# Patient Record
Sex: FEMALE | Race: WHITE | Employment: FULL TIME | URBAN - METROPOLITAN AREA
[De-identification: names, ages, dates, MRNs, and addresses within clinical notes are randomized per-mention and may not be internally consistent; named-entity substitution may affect disease eponyms.]

---

## 2019-08-30 ENCOUNTER — HOSPITAL ENCOUNTER (OUTPATIENT)
Dept: PREADMISSION TESTING | Age: 54
Discharge: HOME | End: 2019-08-30
Payer: COMMERCIAL

## 2019-08-30 DIAGNOSIS — Z12.11 SPECIAL SCREENING FOR MALIGNANT NEOPLASMS, COLON: ICD-10-CM

## 2019-08-30 LAB
ALBUMIN SERPL-MCNC: 3.5 G/DL (ref 3.4–5)
ALBUMIN/GLOB SERPL: 1.1 {RATIO} (ref 0.8–1.7)
ALP SERPL-CCNC: 101 U/L (ref 45–117)
ALT SERPL-CCNC: 31 U/L (ref 13–56)
ANION GAP SERPL CALC-SCNC: 4 MMOL/L (ref 3–18)
AST SERPL-CCNC: 12 U/L (ref 10–38)
BASOPHILS # BLD: 0 K/UL (ref 0–0.1)
BASOPHILS NFR BLD: 0 % (ref 0–2)
BILIRUB SERPL-MCNC: 0.3 MG/DL (ref 0.2–1)
BUN SERPL-MCNC: 15 MG/DL (ref 7–18)
BUN/CREAT SERPL: 21 (ref 12–20)
CALCIUM SERPL-MCNC: 9.2 MG/DL (ref 8.5–10.1)
CHLORIDE SERPL-SCNC: 105 MMOL/L (ref 100–111)
CO2 SERPL-SCNC: 31 MMOL/L (ref 21–32)
CREAT SERPL-MCNC: 0.73 MG/DL (ref 0.6–1.3)
DIFFERENTIAL METHOD BLD: ABNORMAL
EOSINOPHIL # BLD: 0.2 K/UL (ref 0–0.4)
EOSINOPHIL NFR BLD: 3 % (ref 0–5)
ERYTHROCYTE [DISTWIDTH] IN BLOOD BY AUTOMATED COUNT: 14.2 % (ref 11.6–14.5)
GLOBULIN SER CALC-MCNC: 3.1 G/DL (ref 2–4)
GLUCOSE SERPL-MCNC: 70 MG/DL (ref 74–99)
HCT VFR BLD AUTO: 37.6 % (ref 35–45)
HGB BLD-MCNC: 11.9 G/DL (ref 12–16)
LYMPHOCYTES # BLD: 1.7 K/UL (ref 0.9–3.6)
LYMPHOCYTES NFR BLD: 25 % (ref 21–52)
MCH RBC QN AUTO: 27.5 PG (ref 24–34)
MCHC RBC AUTO-ENTMCNC: 31.6 G/DL (ref 31–37)
MCV RBC AUTO: 86.8 FL (ref 74–97)
MONOCYTES # BLD: 0.4 K/UL (ref 0.05–1.2)
MONOCYTES NFR BLD: 6 % (ref 3–10)
NEUTS SEG # BLD: 4.5 K/UL (ref 1.8–8)
NEUTS SEG NFR BLD: 66 % (ref 40–73)
PLATELET # BLD AUTO: 334 K/UL (ref 135–420)
PMV BLD AUTO: 10 FL (ref 9.2–11.8)
POTASSIUM SERPL-SCNC: 4.4 MMOL/L (ref 3.5–5.5)
PROT SERPL-MCNC: 6.6 G/DL (ref 6.4–8.2)
RBC # BLD AUTO: 4.33 M/UL (ref 4.2–5.3)
SODIUM SERPL-SCNC: 140 MMOL/L (ref 136–145)
WBC # BLD AUTO: 6.8 K/UL (ref 4.6–13.2)

## 2019-08-30 PROCEDURE — 36415 COLL VENOUS BLD VENIPUNCTURE: CPT

## 2019-08-30 PROCEDURE — 93005 ELECTROCARDIOGRAM TRACING: CPT

## 2019-08-30 PROCEDURE — 80053 COMPREHEN METABOLIC PANEL: CPT

## 2019-08-30 PROCEDURE — 85025 COMPLETE CBC W/AUTO DIFF WBC: CPT

## 2019-08-31 LAB
ATRIAL RATE: 61 BPM
CALCULATED P AXIS, ECG09: 60 DEGREES
CALCULATED R AXIS, ECG10: -19 DEGREES
CALCULATED T AXIS, ECG11: 47 DEGREES
DIAGNOSIS, 93000: NORMAL
P-R INTERVAL, ECG05: 144 MS
Q-T INTERVAL, ECG07: 382 MS
QRS DURATION, ECG06: 88 MS
QTC CALCULATION (BEZET), ECG08: 384 MS
VENTRICULAR RATE, ECG03: 61 BPM

## 2019-09-12 ENCOUNTER — ANESTHESIA EVENT (OUTPATIENT)
Dept: ENDOSCOPY | Age: 54
End: 2019-09-12
Payer: COMMERCIAL

## 2019-09-12 RX ORDER — OXYCODONE AND ACETAMINOPHEN 5; 325 MG/1; MG/1
1 TABLET ORAL AS NEEDED
Status: CANCELLED | OUTPATIENT
Start: 2019-09-12

## 2019-09-12 RX ORDER — MAGNESIUM SULFATE 100 %
4 CRYSTALS MISCELLANEOUS AS NEEDED
Status: CANCELLED | OUTPATIENT
Start: 2019-09-12

## 2019-09-12 RX ORDER — NALOXONE HYDROCHLORIDE 0.4 MG/ML
0.1 INJECTION, SOLUTION INTRAMUSCULAR; INTRAVENOUS; SUBCUTANEOUS
Status: CANCELLED | OUTPATIENT
Start: 2019-09-12

## 2019-09-12 RX ORDER — SODIUM CHLORIDE, SODIUM LACTATE, POTASSIUM CHLORIDE, CALCIUM CHLORIDE 600; 310; 30; 20 MG/100ML; MG/100ML; MG/100ML; MG/100ML
50 INJECTION, SOLUTION INTRAVENOUS CONTINUOUS
Status: CANCELLED | OUTPATIENT
Start: 2019-09-12

## 2019-09-12 RX ORDER — FENTANYL CITRATE 50 UG/ML
25 INJECTION, SOLUTION INTRAMUSCULAR; INTRAVENOUS
Status: CANCELLED | OUTPATIENT
Start: 2019-09-12

## 2019-09-12 RX ORDER — INSULIN LISPRO 100 [IU]/ML
INJECTION, SOLUTION INTRAVENOUS; SUBCUTANEOUS ONCE
Status: CANCELLED | OUTPATIENT
Start: 2019-09-12 | End: 2019-09-13

## 2019-09-12 RX ORDER — ONDANSETRON 2 MG/ML
4 INJECTION INTRAMUSCULAR; INTRAVENOUS ONCE
Status: CANCELLED | OUTPATIENT
Start: 2019-09-12 | End: 2019-09-12

## 2019-09-12 RX ORDER — HYDROMORPHONE HYDROCHLORIDE 2 MG/ML
0.5 INJECTION, SOLUTION INTRAMUSCULAR; INTRAVENOUS; SUBCUTANEOUS
Status: CANCELLED | OUTPATIENT
Start: 2019-09-12

## 2019-09-13 ENCOUNTER — ANESTHESIA (OUTPATIENT)
Dept: ENDOSCOPY | Age: 54
End: 2019-09-13
Payer: COMMERCIAL

## 2019-09-13 ENCOUNTER — HOSPITAL ENCOUNTER (OUTPATIENT)
Age: 54
Setting detail: OUTPATIENT SURGERY
Discharge: HOME | End: 2019-09-13
Attending: SURGERY | Admitting: SURGERY
Payer: COMMERCIAL

## 2019-09-13 VITALS
BODY MASS INDEX: 38.29 KG/M2 | DIASTOLIC BLOOD PRESSURE: 82 MMHG | RESPIRATION RATE: 14 BRPM | SYSTOLIC BLOOD PRESSURE: 149 MMHG | HEART RATE: 74 BPM | WEIGHT: 208.1 LBS | OXYGEN SATURATION: 94 % | TEMPERATURE: 97.3 F | HEIGHT: 62 IN

## 2019-09-13 LAB — HCG UR QL: NEGATIVE

## 2019-09-13 PROCEDURE — 77030020256 HC SOL INJ NACL 0.9%  500ML: Performed by: SURGERY

## 2019-09-13 PROCEDURE — 74011000250 HC RX REV CODE- 250

## 2019-09-13 PROCEDURE — 74011250636 HC RX REV CODE- 250/636

## 2019-09-13 PROCEDURE — 76040000019: Performed by: SURGERY

## 2019-09-13 PROCEDURE — 81025 URINE PREGNANCY TEST: CPT

## 2019-09-13 PROCEDURE — 76060000031 HC ANESTHESIA FIRST 0.5 HR: Performed by: SURGERY

## 2019-09-13 PROCEDURE — 77030040361 HC SLV COMPR DVT MDII -B: Performed by: SURGERY

## 2019-09-13 PROCEDURE — 77030021593 HC FCPS BIOP ENDOSC BSC -A: Performed by: SURGERY

## 2019-09-13 PROCEDURE — 88305 TISSUE EXAM BY PATHOLOGIST: CPT

## 2019-09-13 RX ORDER — GLYCOPYRROLATE 0.2 MG/ML
INJECTION INTRAMUSCULAR; INTRAVENOUS AS NEEDED
Status: DISCONTINUED | OUTPATIENT
Start: 2019-09-13 | End: 2019-09-13 | Stop reason: HOSPADM

## 2019-09-13 RX ORDER — PROPOFOL 10 MG/ML
INJECTION, EMULSION INTRAVENOUS AS NEEDED
Status: DISCONTINUED | OUTPATIENT
Start: 2019-09-13 | End: 2019-09-13 | Stop reason: HOSPADM

## 2019-09-13 RX ORDER — SODIUM CHLORIDE 9 MG/ML
150 INJECTION, SOLUTION INTRAVENOUS CONTINUOUS
Status: DISCONTINUED | OUTPATIENT
Start: 2019-09-13 | End: 2019-09-13 | Stop reason: HOSPADM

## 2019-09-13 RX ORDER — SODIUM CHLORIDE 9 MG/ML
125 INJECTION, SOLUTION INTRAVENOUS CONTINUOUS
Status: CANCELLED | OUTPATIENT
Start: 2019-09-13

## 2019-09-13 RX ADMIN — PROPOFOL 50 MG: 10 INJECTION, EMULSION INTRAVENOUS at 10:04

## 2019-09-13 RX ADMIN — PROPOFOL 50 MG: 10 INJECTION, EMULSION INTRAVENOUS at 10:12

## 2019-09-13 RX ADMIN — PROPOFOL 50 MG: 10 INJECTION, EMULSION INTRAVENOUS at 10:08

## 2019-09-13 RX ADMIN — GLYCOPYRROLATE 0.3 MG: 0.2 INJECTION INTRAMUSCULAR; INTRAVENOUS at 10:04

## 2019-09-13 RX ADMIN — PROPOFOL 50 MG: 10 INJECTION, EMULSION INTRAVENOUS at 10:01

## 2019-09-13 NOTE — PROCEDURES
Colonoscopy Procedure Note    Indications: Screening for colon cancer    Surgeon(s): Surgeon(s) and Role:     Moris Ramirez DO - Primary    Assistant(s): Endoscopy Technician-1: Niya Holm CNA  Endoscopy RN-1: Landon Devries    Anesthesia: MAC     Pre-Procedure Exam:  Airway: clear   Heart: normal S1and S2    Lungs: clear bilateral  Abdomen: soft, nontender, bowel sounds present and normal in all quadrants   Mental Status: awake, alert, and oriented to person, place, and time      Procedure in Detail:  Informed consent was obtained for the procedure, including sedation. Risks of perforation, hemorrhage, adverse drug reaction, and aspiration were discussed. The patient was placed in the left lateral decubitus position. Based on the pre-procedure assessment, including review of the patient's medical history, medications, allergies, and review of systems, he had been deemed to be an appropriate candidate for moderate sedation; he was therefore sedated with the medications listed above. The patient was monitored continuously with ECG tracing, pulse oximetry, blood pressure monitoring, and direct observations. A digital rectal examination was performed. The [id number] was inserted into the rectum and advanced under direct vision to the cecum, which was identified by the ileocecal valve and appendiceal orifice. The quality of the colonic preparation was fair. A careful inspection of the mucosa was made as the colonoscope was withdrawn, including a careful and complete straight view of the rectum and anorectal junction; findings and interventions are described below. Appropriate photodocumentation was obtained.     Findings: lots of stool throughout obscuring views for polyps less than 1cm  Rectum:   - Protruding lesions:     -Sessile Polyp(s) size 3 mm removed by polypectomy (cold biopsy)  Sigmoid: Normal  Descending Colon: Normal  Transverse Colon: Normal  Ascending Colon: Normal  Cecum: Normal  Terminal Ileum: Not entered    Specimens: Specimens were collected and sent to pathology. EBL: None    Complications: None; patient tolerated the procedure well. Recommendations:   - Repeat colonoscopy in 5 years. - Follow up with primary care physician.     - If < 10 years, reason: inadequate prep   My office will send pathology results in the mail in a couple of weeks    Signed By: DR. Harrison Krishnamurthy, [address] [place] [address] Colorectal Surgery                       9/13/2019

## 2019-09-13 NOTE — DISCHARGE INSTRUCTIONS
Brittney Cohen  [id number]  1965    COLON DISCHARGE INSTRUCTIONS    Discomfort:  Redness at IV site- apply warm compress to area; if redness or soreness persist- contact your physician  There may be a slight amount of blood passed from the rectum  Gaseous discomfort- walking, belching will help relieve any discomfort  You should not operate a vehicle for 12 hours  You should not engage in an occupation involving machinery or appliances for rest of today  You may not drink alcoholic beverages for at least 12 hours  Avoid making any critical decisions for at least 24 hour  DIET:   Regular or resume diet normally appropriate for you. - however -  remember your colon is empty and a heavy meal will produce gas. Avoid these foods:  vegetables, fried / greasy foods, carbonated drinks for today     ACTIVITY:  You may resume your normal daily activities it is recommended that you spend the remainder of the day resting -  avoid any strenuous activity. CALL M.D. ANY SIGN OF:   Increasing pain, nausea, vomiting  Abdominal distension (swelling)  New increased bleeding (oral or rectal)  Fever (chills)  Pain in chest area  Bloody discharge from nose or mouth  Shortness of breath      Follow-up Instructions:   If biopsies were taken, please call Dr. Channing Sheehan office in next 2 weeks, [phone]. Otherwise follow up with your PCM. DISCHARGE SUMMARY from Nurse    PATIENT INSTRUCTIONS:    After general anesthesia or intravenous sedation, for 24 hours or while taking prescription Narcotics:  · Limit your activities  · Do not drive and operate hazardous machinery  · Do not make important personal or business decisions  · Do  not drink alcoholic beverages  · If you have not urinated within 8 hours after discharge, please contact your surgeon on call.     Report the following to your surgeon:  · Excessive pain, swelling, redness or odor of or around the surgical area  · Temperature over 100.5  · Nausea and vomiting lasting longer than 4 hours or if unable to take medications  · Any signs of decreased circulation or nerve impairment to extremity: change in color, persistent  numbness, tingling, coldness or increase pain  · Any questions    *  Please give a list of your current medications to your Primary Care Provider. *  Please update this list whenever your medications are discontinued, doses are      changed, or new medications (including over-the-counter products) are added. *  Please carry medication information at all times in case of emergency situations. These are general instructions for a healthy lifestyle:    No smoking/ No tobacco products/ Avoid exposure to second hand smoke  Surgeon General's Warning:  Quitting smoking now greatly reduces serious risk to your health. Obesity, smoking, and sedentary lifestyle greatly increases your risk for illness    A healthy diet, regular physical exercise & weight monitoring are important for maintaining a healthy lifestyle    You may be retaining fluid if you have a history of heart failure or if you experience any of the following symptoms:  Weight gain of 3 pounds or more overnight or 5 pounds in a week, increased swelling in our hands or feet or shortness of breath while lying flat in bed. Please call your doctor as soon as you notice any of these symptoms; do not wait until your next office visit. The discharge information has been reviewed with the patient and caregiver. The patient and caregiver verbalized understanding. Discharge medications reviewed with the patient and caregiver and appropriate educational materials and side effects teaching were provided.   Patient armband removed and shredded  ___________________________________________________________________________________________________________________________________                      Trista Dickens  [id number]  1965        DISCHARGE SUMMARY from Nurse    The following personal items collected during your admission are returned to you:   Dental Appliance:    Vision:    Hearing Aid:    Jewelry:    Clothing:    Other Valuables:    Valuables sent to safe:

## 2019-09-13 NOTE — INTERVAL H&P NOTE
H&P Update:  Mary Best was seen and examined. History and physical has been reviewed. The patient has been examined. There have been no significant clinical changes since the completion of the originally dated History and Physical.  Patient identified by surgeon; surgical site was confirmed by patient and surgeon.

## 2019-09-13 NOTE — H&P
Assessment/Plan  # Detail Type Description    1. Assessment Encounter for other preprocedural examination (Z01.818). Impression Patient is asymptomatic and due for a screening colonoscopy. Here for pre-procedure evaluation for medical issues which may interfere with colonoscopy success as well as clearance for anesthesia. pt does have sleep apnea. Patient Plan Pt is an average risk patient presenting for colonoscopy and colon cancer screening. Pt risk was determined based on NCCN guidelines V1.2016. Risks benefits and alternatives of colonoscopy were discussed to include FOBT, barium enema, CT virtual colonoscopy. Risk of perforation, bleeding or gas pain, missed polyps, inability to complete full colonoscopy, anesthesia complications discussed. Expectations of preoperative bowel prep /procedure described. Literature was given to the patient and reviewed. The patient showed understanding and agreed with the plan as above. Extended time was taken to answer all patient questions. Extra pre-procedure paperwork and counseling completed. Bowel prep prescription and directions provided. Pt has been educated on the bowel prep. Pt has been instructed that he is not to eat legumes, nuts, seeds or vegetables that are high in cellulose one week prior to the planned procedure. Pt should avoid red, purple and orange liquids, christine, and juices. Pt is to be on a clear liquid diet 24 hours prior to the procedure. Pt will start the prep at 1800 hours the night prior to the procedure with the second portion of the prep to be taken 6 hours prior to time of the appointment. Provider Plan  Bowel prep instructions were provided for mechanical clearance of stool. A modified Barcenas antibiotic prep was also prescribed which the patient will complete the day prior to surgery. Prescriptions and instructions provided to the patient. 2. Assessment MARGARETH (obstructive sleep apnea) (G47.33).          3. Assessment Body mass index (BMI) 39.0-39.9, adult (Z68.39). Plan Orders Today's instructions / counseling include(s) Dietary management education, guidance, and counseling. This 48year old female presents for Colon Cancer Screen. History of Present Illness:  1. Colon Cancer Screen   No prior screening. Denies risk factors. Pertinent negatives include abdominal pain, constipation, diarrhea and vomiting. Additional information: No family history of colon cancer, No family history of Crohn's/colitis, No NSAID/ASA use and 48year old female being seen today for a c-scope consult. no anesthesia problems ( pt does have sleep apnea), no blood thinners,  prior abd surgeries          PROBLEM LIST:   Problem List reviewed. Problem Description Onset Date Chronic Clinical Status Notes   Type II diabetes mellitus uncontrolled 2014 Y     Depressive disorder 2012 Y     Hyperlipidemia 2012 Y     Insomnia 2012 Y     Blood chemistry abnormal 2012 Y     Wheezing  N     MARGARETH (obstructive sleep apnea)  N           PAST MEDICAL/SURGICAL HISTORY  (Detailed)    Disease/disorder Onset Date Management Date Comments     breast reduction     pregnancy       Pregnancy   x 1, sab x 2 (D&C x 1)  [facility] [place] 2015 -     Family History  (Detailed)  Relationship Family Member Name  Age at Death Condition Onset Age Cause of Death       Family history of Alzheimer's disease  N   Father    Cancer, brain  N   Mother    Hypertension  N   Mother    Non-Hodgkin's lymphoma  N       Social History:  (Detailed)  Tobacco use reviewed. Preferred language is [place]. The patient does not need an . MARITAL STATUS/FAMILY/SOCIAL SUPPORT  Currently . Tobacco use status: Never smoked tobacco.  Smoking status: Never smoker.     SMOKING STATUS  Use Status Type Smoking Status Usage Per Day Years Used Total Pack Years   no/never  Never smoker        TOBACCO/VAPING EXPOSURE  No passive smoke exposure. ALCOHOL  There is a history of alcohol use. Type: Wine. consumed socially. CAFFEINE  The patient uses caffeine: coffee and soda. - 4 cups a day. Medications (active prior to today)  Medication Instructions Start Date Stop Date Refilled Elsewhere   ProAir RespiClick 90 mcg/actuation breath activated inhale 2 puff by inhalation route  every 4 - 6 hours as needed 05/03/2018   N   Symbicort 80 mcg-4.5 mcg/actuation HFA aerosol inhaler inhale 2 puff by inhalation route 2 times every day in the morning and evening 12/17/2018   N   Ambien 10 mg tablet take 1 by Oral route at bedtime 02/18/2019 02/18/2019 N   Ativan 0.5 mg tablet 1 or 2 tabs po qd prn STRESS 02/18/2019 02/18/2019 N   Zoloft 50 mg tablet take 1 tablet by oral route  every day 02/18/2019 02/18/2019 N   metoprolol succinate ER 50 mg tablet,extended release 24 hr take 1 tablet by oral route  every day 02/18/2019 02/18/2019 N   ibuprofen 800 mg tablet take 1 tablet (800MG)  by oral route 3 times every day with food 02/18/2019 02/18/2019 N   atorvastatin 20 mg tablet take 1 tablet by oral route  every day 02/18/2019 02/18/2019 N     Patient Status   Completed with information received for patient transitioning into care. Medication Reconciliation  Medications reconciled today.   Medication Reviewed  Adherence Medication Name Sig Desc Elsewhere Status   taking as directed Ambien 10 mg tablet take 1 by Oral route at bedtime N Verified   taking as directed Zoloft 50 mg tablet take 1 tablet by oral route  every day N Verified   taking as directed Symbicort 80 mcg-4.5 mcg/actuation HFA aerosol inhaler inhale 2 puff by inhalation route 2 times every day in the morning and evening N Verified   taking as directed Ativan 0.5 mg tablet 1 or 2 tabs po qd prn STRESS N Verified   taking as directed atorvastatin 20 mg tablet take 1 tablet by oral route  every day N Verified   taking as directed metoprolol succinate ER 50 mg tablet,extended release 24 hr take 1 tablet by oral route  every day N Verified   taking as directed ibuprofen 800 mg tablet take 1 tablet (800MG)  by oral route 3 times every day with food N Verified   taking as directed ProAir RespiClick 90 mcg/actuation breath activated inhale 2 puff by inhalation route  every 4 - 6 hours as needed N Verified     Allergies:  Ingredient Reaction (Severity) Medication Name Comment   NO KNOWN ALLERGIES      Reviewed, no changes. Review of Systems  System Neg/Pos Details   Constitutional Negative Fatigue, Fever and Night sweats. ENMT Negative Ear drainage, Hearing loss and Nasal drainage. Eyes Negative Eye discharge, Vision changes and Vision loss. Respiratory Negative Cough, Dyspnea and Wheezing. Cardio Negative Chest pain, Claudication and Irregular heartbeat/palpitations. GI Negative Abdominal pain, Constipation, Diarrhea and Vomiting.  Negative Dysuria, Hematuria and Polyuria (Genitourinary). Endocrine Negative Cold intolerance, Heat intolerance, Polydipsia, Polyphagia and Polyuria (Endocrine). Neuro Negative Gait disturbance. Psych Negative Anxiety and Depression. Integumentary Negative Pruritus and Rash. MS Positive Back pain. MS Negative Joint swelling and Muscle weakness. Hema/Lymph Negative Easy bleeding and Easy bruising. Allergic/Immuno Negative Environmental allergies and Food allergies. Reproductive Negative Dysmenorrhea, Irregular menses, Menorrhagia and Vaginal discharge.      Vital Signs     Height  Time ft in cm Last Measured Height Position   1:15 PM 5.0 2.00 157.48 03/27/2019 Standing     Weight/BSA/BMI  Time lb oz kg Context BMI kg/m2 BSA m2   1:15 .40  96.797 dressed with shoes 39.03      Blood Pressure  Time BP mm/Hg Position Side Site Method Cuff Size   1:15 /86 sitting right arm manual adult large     Temperature/Pulse/Respiration  Time Temp F Temp C Temp Site Pulse/min Pattern Resp/ min   1:15 PM    84 regular 12     Pulse Oximetry/FIO2  Time Pulse Ox (Rest %) Pulse Ox (Amb %) O2 Sat O2 L/Min Timing FiO2 % L/min Delivery Method Finger Probe   1:15 PM 98  RA           Pain Scale  Time Pain Score Method   1:15 PM 0/10 Numeric Pain Intensity Scale     Measured By  Time Measured by   1:15 PM Neal Omer       Physical Exam:  Exam Findings Details   Constitutional Normal Well developed. Eyes Normal Conjunctiva - Right: Normal, Left: Normal. Pupil - Right: Normal, Left: Normal.   Ears Normal Inspection - Right: Normal, Left: Normal.   Nose/Mouth/Throat Normal External nose - Normal. Lips/teeth/gums - Normal.   Neck Exam Normal Inspection - Normal. Thyroid gland - Normal.   Respiratory Normal Inspection - Normal. Auscultation - Normal. Effort - Normal.   Cardiovascular Normal Regular rate and rhythm. No murmurs, gallops, or rubs. Vascular Normal Pulses - Dorsalis pedis: Normal. Capillary refill - Less than 2 seconds. Abdomen Normal Inspection - Normal. Auscultation - Normal. No abdominal tenderness. No hepatic enlargement. No spleen enlargement. No hernia. Rectal Normal Fecal occult blood test - Not indicated. Skin Normal Inspection - Normal.   Musculoskeletal Normal Visual overview of all four extremities is normal.   Extremity Normal No edema. Neurological Normal Memory - Normal. Cranial nerves - Cranial nerves II through XII grossly intact. Psychiatric Normal Orientation - Oriented to time, place, person & situation. Appropriate mood and affect. Normal insight. Normal judgment. Patient Education  # Patient Education   1. Colonoscopy: Before Your Procedure   2. Colonoscopy: What to Expect at Home   3.  Learning About Colonoscopy     Medications (added, continued, or stopped this visit):  Start Date Medication Directions PRN Status PRN Reason Instruction Stop Date   02/18/2019 Ambien 10 mg tablet take 1 by Oral route at bedtime N      02/18/2019 Ativan 0.5 mg tablet 1 or 2 tabs po qd prn STRESS N      02/18/2019 atorvastatin 20 mg tablet take 1 tablet by oral route  every day N      02/18/2019 ibuprofen 800 mg tablet take 1 tablet (800MG)  by oral route 3 times every day with food N      02/18/2019 metoprolol succinate ER 50 mg tablet,extended release 24 hr take 1 tablet by oral route  every day N      37/21/3780 ProAir RespiClick 90 mcg/actuation breath activated inhale 2 puff by inhalation route  every 4 - 6 hours as needed N      12/17/2018 Symbicort 80 mcg-4.5 mcg/actuation HFA aerosol inhaler inhale 2 puff by inhalation route 2 times every day in the morning and evening N  use coupon; stop Breo inhaler    02/18/2019 Zoloft 50 mg tablet take 1 tablet by oral route  every day N          Active Patient Care Team Members    Name Contact Agency Type Support Role Relationship Active Date Inactive Date Specialty   Jaz Barraza   Patient provider PCP   Family Practice   Aleida Vasques   encounter provider    Pulmonary Medicine

## 2019-09-13 NOTE — ANESTHESIA PREPROCEDURE EVALUATION
Relevant Problems   No relevant active problems       Anesthetic History   No history of anesthetic complications            Review of Systems / Medical History  Patient summary reviewed, nursing notes reviewed and pertinent labs reviewed    Pulmonary        Sleep apnea    Asthma        Neuro/Psych              Cardiovascular    Hypertension        Dysrhythmias            GI/Hepatic/Renal     GERD           Endo/Other        Arthritis     Other Findings              Physical Exam    Airway  Mallampati: II  TM Distance: 4 - 6 cm  Neck ROM: normal range of motion   Mouth opening: Normal     Cardiovascular  Regular rate and rhythm,  S1 and S2 normal,  no murmur, click, rub, or gallop             Dental  No notable dental hx       Pulmonary  Breath sounds clear to auscultation               Abdominal  GI exam deferred       Other Findings            Anesthetic Plan    ASA: 3  Anesthesia type: MAC          Induction: Intravenous  Anesthetic plan and risks discussed with: Patient

## 2019-09-16 NOTE — ANESTHESIA POSTPROCEDURE EVALUATION
Post-Anesthesia Evaluation and Assessment    Cardiovascular Function/Vital Signs  Visit Vitals  /82   Pulse 74   Temp 36.3 °C (97.3 °F)   Resp 14   Ht 5' 2\" (1.575 m)   Wt 94.4 kg (208 lb 1.6 oz)   SpO2 94%   BMI 38.06 kg/m²       Patient is status post Procedure(s):  COLONOSCOPY W/MAC; POLYPECTOMY (RECTAL). Nausea/Vomiting: Controlled. Postoperative hydration reviewed and adequate. Pain:  Pain Scale 1: Numeric (0 - 10) (09/13/19 1031)  Pain Intensity 1: 0 (09/13/19 1031)   Managed. Neurological Status: At baseline. Mental Status and Level of Consciousness: Baseline and stable. Pulmonary Status:   O2 Device: Room air (09/13/19 1031)   Adequate oxygenation and airway patent. Complications related to anesthesia: None    Post-anesthesia assessment completed. No concerns. Patient has met all discharge requirements.     Signed By: Luigi Forrester MD

## 2022-12-07 ENCOUNTER — HOSPITAL ENCOUNTER (OUTPATIENT)
Dept: LAB | Age: 57
Discharge: HOME | End: 2022-12-07
Payer: COMMERCIAL

## 2022-12-07 LAB
ALBUMIN SERPL-MCNC: 3.7 G/DL (ref 3.4–5)
ALBUMIN/GLOB SERPL: 1.2 {RATIO} (ref 0.8–1.7)
ALP SERPL-CCNC: 112 U/L (ref 45–117)
ALT SERPL-CCNC: 30 U/L (ref 13–56)
ANION GAP SERPL CALC-SCNC: 4 MMOL/L (ref 3–18)
AST SERPL-CCNC: 15 U/L (ref 10–38)
BASOPHILS # BLD: 0 K/UL (ref 0–0.1)
BASOPHILS NFR BLD: 1 % (ref 0–2)
BILIRUB SERPL-MCNC: 0.3 MG/DL (ref 0.2–1)
BUN SERPL-MCNC: 20 MG/DL (ref 7–18)
BUN/CREAT SERPL: 25 (ref 12–20)
CALCIUM SERPL-MCNC: 9.5 MG/DL (ref 8.5–10.1)
CHLORIDE SERPL-SCNC: 104 MMOL/L (ref 100–111)
CO2 SERPL-SCNC: 30 MMOL/L (ref 21–32)
CREAT SERPL-MCNC: 0.81 MG/DL (ref 0.6–1.3)
DIFFERENTIAL METHOD BLD: ABNORMAL
EOSINOPHIL # BLD: 0.3 K/UL (ref 0–0.4)
EOSINOPHIL NFR BLD: 3 % (ref 0–5)
ERYTHROCYTE [DISTWIDTH] IN BLOOD BY AUTOMATED COUNT: 14.2 % (ref 11.6–14.5)
GLOBULIN SER CALC-MCNC: 3.2 G/DL (ref 2–4)
GLUCOSE SERPL-MCNC: 82 MG/DL (ref 74–99)
HCT VFR BLD AUTO: 36.5 % (ref 35–45)
HGB BLD-MCNC: 11.7 G/DL (ref 12–16)
IMM GRANULOCYTES # BLD AUTO: 0 K/UL (ref 0–0.04)
IMM GRANULOCYTES NFR BLD AUTO: 0 % (ref 0–0.5)
LYMPHOCYTES # BLD: 2.1 K/UL (ref 0.9–3.6)
LYMPHOCYTES NFR BLD: 26 % (ref 21–52)
MCH RBC QN AUTO: 28.2 PG (ref 24–34)
MCHC RBC AUTO-ENTMCNC: 32.1 G/DL (ref 31–37)
MCV RBC AUTO: 88 FL (ref 78–100)
MONOCYTES # BLD: 0.6 K/UL (ref 0.05–1.2)
MONOCYTES NFR BLD: 7 % (ref 3–10)
NEUTS SEG # BLD: 5.1 K/UL (ref 1.8–8)
NEUTS SEG NFR BLD: 63 % (ref 40–73)
NRBC # BLD: 0 K/UL (ref 0–0.01)
NRBC BLD-RTO: 0 PER 100 WBC
PLATELET # BLD AUTO: 304 K/UL (ref 135–420)
PMV BLD AUTO: 9.5 FL (ref 9.2–11.8)
POTASSIUM SERPL-SCNC: 4.5 MMOL/L (ref 3.5–5.5)
PROT SERPL-MCNC: 6.9 G/DL (ref 6.4–8.2)
RBC # BLD AUTO: 4.15 M/UL (ref 4.2–5.3)
SODIUM SERPL-SCNC: 138 MMOL/L (ref 136–145)
WBC # BLD AUTO: 8.1 K/UL (ref 4.6–13.2)

## 2022-12-07 PROCEDURE — 36415 COLL VENOUS BLD VENIPUNCTURE: CPT

## 2022-12-07 PROCEDURE — 85025 COMPLETE CBC W/AUTO DIFF WBC: CPT

## 2022-12-07 PROCEDURE — 80053 COMPREHEN METABOLIC PANEL: CPT

## 2022-12-13 ENCOUNTER — HOSPITAL ENCOUNTER (OUTPATIENT)
Dept: NON INVASIVE DIAGNOSTICS | Age: 57
Discharge: HOME | End: 2022-12-13
Payer: COMMERCIAL

## 2022-12-13 ENCOUNTER — TRANSCRIBE ORDER (OUTPATIENT)
Dept: REGISTRATION | Age: 57
End: 2022-12-13

## 2022-12-13 DIAGNOSIS — Z01.812 BLOOD TESTS PRIOR TO TREATMENT OR PROCEDURE: Primary | ICD-10-CM

## 2022-12-13 DIAGNOSIS — Z01.812 BLOOD TESTS PRIOR TO TREATMENT OR PROCEDURE: ICD-10-CM

## 2022-12-13 PROCEDURE — 93005 ELECTROCARDIOGRAM TRACING: CPT

## 2022-12-14 LAB
ATRIAL RATE: 69 BPM
CALCULATED P AXIS, ECG09: 68 DEGREES
CALCULATED R AXIS, ECG10: -30 DEGREES
CALCULATED T AXIS, ECG11: 69 DEGREES
DIAGNOSIS, 93000: NORMAL
P-R INTERVAL, ECG05: 144 MS
Q-T INTERVAL, ECG07: 370 MS
QRS DURATION, ECG06: 98 MS
QTC CALCULATION (BEZET), ECG08: 396 MS
VENTRICULAR RATE, ECG03: 69 BPM

## 2022-12-23 ENCOUNTER — HOSPITAL ENCOUNTER (OUTPATIENT)
Dept: LAB | Age: 57
Discharge: HOME | End: 2022-12-23
Payer: COMMERCIAL

## 2022-12-23 PROCEDURE — 88304 TISSUE EXAM BY PATHOLOGIST: CPT

## 2024-04-18 ENCOUNTER — HOSPITAL ENCOUNTER (OUTPATIENT)
Facility: HOSPITAL | Age: 59
Discharge: HOME | End: 2024-04-18
Payer: COMMERCIAL

## 2024-04-18 ENCOUNTER — TRANSCRIBE ORDERS (OUTPATIENT)
Facility: HOSPITAL | Age: 59
End: 2024-04-18

## 2024-04-18 ENCOUNTER — HOSPITAL ENCOUNTER (OUTPATIENT)
Facility: HOSPITAL | Age: 59
Discharge: HOME | End: 2024-04-20
Payer: COMMERCIAL

## 2024-04-18 DIAGNOSIS — M17.11 PRIMARY OSTEOARTHRITIS OF RIGHT KNEE: Primary | ICD-10-CM

## 2024-04-18 DIAGNOSIS — M17.11 PRIMARY OSTEOARTHRITIS OF RIGHT KNEE: ICD-10-CM

## 2024-04-18 DIAGNOSIS — Z01.810 PRE-OPERATIVE CARDIOVASCULAR EXAMINATION: ICD-10-CM

## 2024-04-18 DIAGNOSIS — Z01.810 PRE-OPERATIVE CARDIOVASCULAR EXAMINATION: Primary | ICD-10-CM

## 2024-04-18 LAB
ALBUMIN SERPL-MCNC: 3.6 G/DL (ref 3.4–5)
ALBUMIN/GLOB SERPL: 1.1 (ref 0.8–1.7)
ALP SERPL-CCNC: 112 U/L (ref 45–117)
ALT SERPL-CCNC: 32 U/L (ref 13–56)
ANION GAP SERPL CALC-SCNC: 4 MMOL/L (ref 3–18)
APPEARANCE UR: CLEAR
APTT PPP: 25 SEC (ref 23–36.4)
AST SERPL-CCNC: 15 U/L (ref 10–38)
BACTERIA URNS QL MICRO: ABNORMAL /HPF
BASOPHILS # BLD: 0 K/UL (ref 0–0.1)
BASOPHILS NFR BLD: 0 % (ref 0–2)
BILIRUB SERPL-MCNC: 0.5 MG/DL (ref 0.2–1)
BILIRUB UR QL: NEGATIVE
BUN SERPL-MCNC: 15 MG/DL (ref 7–18)
BUN/CREAT SERPL: 19 (ref 12–20)
CALCIUM SERPL-MCNC: 9.1 MG/DL (ref 8.5–10.1)
CHLORIDE SERPL-SCNC: 107 MMOL/L (ref 100–111)
CO2 SERPL-SCNC: 30 MMOL/L (ref 21–32)
COLOR UR: YELLOW
CREAT SERPL-MCNC: 0.78 MG/DL (ref 0.6–1.3)
DIFFERENTIAL METHOD BLD: NORMAL
EOSINOPHIL # BLD: 0.2 K/UL (ref 0–0.4)
EOSINOPHIL NFR BLD: 2 % (ref 0–5)
EPITH CASTS URNS QL MICRO: ABNORMAL /LPF (ref 0–5)
ERYTHROCYTE [DISTWIDTH] IN BLOOD BY AUTOMATED COUNT: 13.6 % (ref 11.6–14.5)
EST. AVERAGE GLUCOSE BLD GHB EST-MCNC: 111 MG/DL
GLOBULIN SER CALC-MCNC: 3.4 G/DL (ref 2–4)
GLUCOSE SERPL-MCNC: 87 MG/DL (ref 74–99)
GLUCOSE UR STRIP.AUTO-MCNC: NEGATIVE MG/DL
HBA1C MFR BLD: 5.5 % (ref 4.2–5.6)
HCT VFR BLD AUTO: 38.7 % (ref 35–45)
HGB BLD-MCNC: 12.4 G/DL (ref 12–16)
HGB UR QL STRIP: NEGATIVE
IMM GRANULOCYTES # BLD AUTO: 0 K/UL (ref 0–0.04)
IMM GRANULOCYTES NFR BLD AUTO: 0 % (ref 0–0.5)
INR PPP: 0.9 (ref 0.9–1.1)
KETONES UR QL STRIP.AUTO: NEGATIVE MG/DL
LEUKOCYTE ESTERASE UR QL STRIP.AUTO: ABNORMAL
LYMPHOCYTES # BLD: 1.9 K/UL (ref 0.9–3.6)
LYMPHOCYTES NFR BLD: 26 % (ref 21–52)
MCH RBC QN AUTO: 28.6 PG (ref 24–34)
MCHC RBC AUTO-ENTMCNC: 32 G/DL (ref 31–37)
MCV RBC AUTO: 89.4 FL (ref 78–100)
MONOCYTES # BLD: 0.4 K/UL (ref 0.05–1.2)
MONOCYTES NFR BLD: 6 % (ref 3–10)
MUCOUS THREADS URNS QL MICRO: ABNORMAL /LPF
NEUTS SEG # BLD: 4.7 K/UL (ref 1.8–8)
NEUTS SEG NFR BLD: 65 % (ref 40–73)
NITRITE UR QL STRIP.AUTO: NEGATIVE
NRBC # BLD: 0 K/UL (ref 0–0.01)
NRBC BLD-RTO: 0 PER 100 WBC
PH UR STRIP: 5.5 (ref 5–8)
PLATELET # BLD AUTO: 341 K/UL (ref 135–420)
PMV BLD AUTO: 10 FL (ref 9.2–11.8)
POTASSIUM SERPL-SCNC: 4.3 MMOL/L (ref 3.5–5.5)
PROT SERPL-MCNC: 7 G/DL (ref 6.4–8.2)
PROT UR STRIP-MCNC: NEGATIVE MG/DL
PROTHROMBIN TIME: 12.5 SEC (ref 11.9–14.7)
RBC # BLD AUTO: 4.33 M/UL (ref 4.2–5.3)
RBC #/AREA URNS HPF: ABNORMAL /HPF (ref 0–5)
SODIUM SERPL-SCNC: 141 MMOL/L (ref 136–145)
SP GR UR REFRACTOMETRY: 1.02 (ref 1–1.03)
UROBILINOGEN UR QL STRIP.AUTO: 0.2 EU/DL (ref 0.2–1)
WBC # BLD AUTO: 7.2 K/UL (ref 4.6–13.2)
WBC URNS QL MICRO: ABNORMAL /HPF (ref 0–5)

## 2024-04-18 PROCEDURE — 87086 URINE CULTURE/COLONY COUNT: CPT

## 2024-04-18 PROCEDURE — 81001 URINALYSIS AUTO W/SCOPE: CPT

## 2024-04-18 PROCEDURE — 85610 PROTHROMBIN TIME: CPT

## 2024-04-18 PROCEDURE — 36415 COLL VENOUS BLD VENIPUNCTURE: CPT

## 2024-04-18 PROCEDURE — 85025 COMPLETE CBC W/AUTO DIFF WBC: CPT

## 2024-04-18 PROCEDURE — 80053 COMPREHEN METABOLIC PANEL: CPT

## 2024-04-18 PROCEDURE — 83036 HEMOGLOBIN GLYCOSYLATED A1C: CPT

## 2024-04-18 PROCEDURE — 93005 ELECTROCARDIOGRAM TRACING: CPT

## 2024-04-18 PROCEDURE — 85730 THROMBOPLASTIN TIME PARTIAL: CPT

## 2024-04-19 LAB
BACTERIA SPEC CULT: NORMAL
EKG ATRIAL RATE: 80 BPM
EKG DIAGNOSIS: NORMAL
EKG P AXIS: 61 DEGREES
EKG P-R INTERVAL: 158 MS
EKG Q-T INTERVAL: 352 MS
EKG QRS DURATION: 96 MS
EKG QTC CALCULATION (BAZETT): 405 MS
EKG R AXIS: -38 DEGREES
EKG T AXIS: 54 DEGREES
EKG VENTRICULAR RATE: 80 BPM
SERVICE CMNT-IMP: NORMAL
SERVICE CMNT-IMP: NORMAL

## (undated) DEVICE — SINGLE PORT MANIFOLD: Brand: NEPTUNE 2

## (undated) DEVICE — KENDALL RADIOLUCENT FOAM MONITORING ELECTRODE RECTANGULAR SHAPE: Brand: KENDALL

## (undated) DEVICE — ENDO CARRY-ON PROCEDURE KIT INCLUDES ENZYMATIC SPONGE, GAUZE, BIOHAZARD LABEL, TRAY, LUBRICANT, DIRTY SCOPE LABEL, WATER LABEL, TRAY, DRAWSTRING PAD, AND DEFENDO 4-PIECE KIT.: Brand: ENDO CARRY-ON PROCEDURE KIT

## (undated) DEVICE — WRISTBAND ID AD W2.5XL9.5CM RED VYN ADH CLSR UNI-PRINT

## (undated) DEVICE — GAUZE SPNG AVANT 4X4 4PLY NS --

## (undated) DEVICE — SYR 5ML 1/5 GRAD LL NSAF LF --

## (undated) DEVICE — TRAP SPEC COLL POLYP POLYSTYR --

## (undated) DEVICE — SOLUTION IV 500ML 0.9% SOD CHL FLX CONT

## (undated) DEVICE — NDL FLTR TIP 5 MIC 18GX1.5IN --

## (undated) DEVICE — MAJ-1414 SINGLE USE ADPATER BIOPSY VALV: Brand: SINGLE USE ADAPTOR BIOPSY VALVE

## (undated) DEVICE — CATH SUC CTRL PRT TRIFLO 14FR --

## (undated) DEVICE — CANNULA CUSH AD W/ 14FT TBG

## (undated) DEVICE — MEDI-VAC NON-CONDUCTIVE SUCTION TUBING: Brand: CARDINAL HEALTH

## (undated) DEVICE — SPONGE GZ W4XL4IN COT 12 PLY TYP VII WVN C FLD DSGN

## (undated) DEVICE — SYR 3ML LL TIP 1/10ML GRAD --

## (undated) DEVICE — SYRINGE 50ML E/T

## (undated) DEVICE — GARMENT,MEDLINE,DVT,INT,CALF,MED, GEN2: Brand: MEDLINE

## (undated) DEVICE — NDL PRT INJ NSAF BLNT 18GX1.5 --

## (undated) DEVICE — SET ADMIN 16ML TBNG L100IN 2 Y INJ SITE IV PIGGY BK DISP

## (undated) DEVICE — TOURNIQUET PHLEB W1XL18IN BLU FLAT RL AND BND REUSE FOR IV

## (undated) DEVICE — CATH IV SAFE STR 22GX1IN BLU -- PROTECTIV PLUS

## (undated) DEVICE — FORCEPS BX CAP 240CM L RAD JAW 4